# Patient Record
Sex: MALE | Employment: FULL TIME | URBAN - METROPOLITAN AREA
[De-identification: names, ages, dates, MRNs, and addresses within clinical notes are randomized per-mention and may not be internally consistent; named-entity substitution may affect disease eponyms.]

---

## 2021-06-15 ENCOUNTER — NON-PROVIDER VISIT (OUTPATIENT)
Dept: OCCUPATIONAL MEDICINE | Facility: CLINIC | Age: 31
End: 2021-06-15

## 2021-06-15 DIAGNOSIS — Z02.1 PRE-EMPLOYMENT DRUG SCREENING: ICD-10-CM

## 2021-06-15 DIAGNOSIS — Z02.1 PRE-EMPLOYMENT HEALTH SCREENING EXAMINATION: ICD-10-CM

## 2021-06-15 LAB
AMP AMPHETAMINE: NORMAL
COC COCAINE: NORMAL
INT CON NEG: NORMAL
INT CON POS: NORMAL
MET METHAMPHETAMINES: NORMAL
OPI OPIATES: NORMAL
PCP PHENCYCLIDINE: NORMAL
POC DRUG COMMENT 753798-OCCUPATIONAL HEALTH: NEGATIVE
THC: NORMAL

## 2021-06-15 PROCEDURE — 80305 DRUG TEST PRSMV DIR OPT OBS: CPT | Performed by: NURSE PRACTITIONER

## 2022-02-10 ENCOUNTER — OFFICE VISIT (OUTPATIENT)
Dept: URGENT CARE | Facility: CLINIC | Age: 32
End: 2022-02-10
Payer: COMMERCIAL

## 2022-02-10 VITALS
HEIGHT: 68 IN | TEMPERATURE: 98.3 F | RESPIRATION RATE: 14 BRPM | HEART RATE: 88 BPM | SYSTOLIC BLOOD PRESSURE: 118 MMHG | WEIGHT: 203.6 LBS | DIASTOLIC BLOOD PRESSURE: 60 MMHG | BODY MASS INDEX: 30.86 KG/M2 | OXYGEN SATURATION: 99 %

## 2022-02-10 DIAGNOSIS — K52.9 GASTROENTERITIS: ICD-10-CM

## 2022-02-10 PROCEDURE — 99204 OFFICE O/P NEW MOD 45 MIN: CPT | Performed by: PHYSICIAN ASSISTANT

## 2022-02-10 RX ORDER — ONDANSETRON 4 MG/1
4 TABLET, ORALLY DISINTEGRATING ORAL EVERY 8 HOURS PRN
Qty: 10 TABLET | Refills: 0 | Status: SHIPPED | OUTPATIENT
Start: 2022-02-10

## 2022-02-10 ASSESSMENT — ENCOUNTER SYMPTOMS
DIARRHEA: 1
VOMITING: 0
CONSTIPATION: 0
BLOOD IN STOOL: 0
ABDOMINAL PAIN: 1
FLANK PAIN: 0
NAUSEA: 1
FEVER: 0
MYALGIAS: 0
FLATUS: 0

## 2022-02-10 NOTE — LETTER
February 10, 2022         Patient: Beto Puri   YOB: 1990   Date of Visit: 2/10/2022           To Whom it May Concern:    Beto Puri was seen in my clinic on 2/10/2022.  Please excuse his absence through 2/14/2022.    If you have any questions or concerns, please don't hesitate to call.        Sincerely,           Edmond Flynn P.A.-C.  Electronically Signed

## 2022-02-10 NOTE — PROGRESS NOTES
Subjective:   Beto Puri is a 31 y.o. male who presents today with   Chief Complaint   Patient presents with   • Abdominal Pain     Pt has abdominal pain, nausea x last night        Abdominal Pain  This is a new problem. The current episode started yesterday. The onset quality is gradual. The problem occurs constantly. The problem has been unchanged. The pain is located in the generalized abdominal region. Associated symptoms include diarrhea and nausea. Pertinent negatives include no constipation, dysuria, fever, flatus, frequency, hematuria, melena, myalgias or vomiting. The pain is aggravated by eating. The pain is relieved by nothing. Treatments tried: [place] diet. The treatment provided mild relief.   Patient states he had COVID back in December.  No recent ill contacts.  Patient states his stomach started hurting after eating fast food last night.  Has had on and off watery diarrhea throughout the day today as well as feeling nauseated but no actual vomiting.  PMH:  has no past medical history on file.  MEDS:   Current Outpatient Medications:   •  ondansetron (ZOFRAN ODT) 4 MG TABLET DISPERSIBLE, Take 1 Tablet by mouth every 8 hours as needed., Disp: 10 Tablet, Rfl: 0  ALLERGIES: No Known Allergies  SURGHX: History reviewed. No pertinent surgical history.  SOCHX:  reports that he has been smoking cigarettes. He has been smoking about 0.00 packs per day. He has never used smokeless tobacco. He reports previous alcohol use. He reports previous drug use.  FH: Reviewed with patient, not pertinent to this visit.     Review of Systems   Constitutional: Negative for fever.   Gastrointestinal: Positive for abdominal pain, diarrhea and nausea. Negative for blood in stool, constipation, flatus, melena and vomiting.   Genitourinary: Negative for dysuria, flank pain, frequency, hematuria and urgency.   Musculoskeletal: Negative for myalgias.      Objective:   /60 (BP Location: Right arm, Patient Position:  "Sitting, BP Cuff Size: Large adult)   Pulse 88   Temp 36.8 °C (98.3 °F) (Temporal)   Resp 14   Ht 1.727 m (5' 8\")   Wt 92.4 kg (203 lb 9.6 oz)   SpO2 99%   BMI 30.96 kg/m²   Physical Exam  Vitals and nursing note reviewed.   Constitutional:       General: He is not in acute distress.     Appearance: Normal appearance. He is well-developed. He is not ill-appearing or toxic-appearing.   HENT:      Head: Normocephalic and atraumatic.      Right Ear: Hearing normal.      Left Ear: Hearing normal.   Cardiovascular:      Rate and Rhythm: Normal rate and regular rhythm.      Heart sounds: Normal heart sounds.   Pulmonary:      Effort: Pulmonary effort is normal.   Abdominal:      General: Bowel sounds are normal. There is no distension.      Tenderness: There is generalized abdominal tenderness.      Comments: No specific acute point tenderness on exam today.   Musculoskeletal:      Comments: Normal movement in all 4 extremities   Skin:     General: Skin is warm and dry.   Neurological:      Mental Status: He is alert.      Coordination: Coordination normal.   Psychiatric:         Mood and Affect: Mood normal.       Assessment/Plan:   Assessment    1. Gastroenteritis  - ondansetron (ZOFRAN ODT) 4 MG TABLET DISPERSIBLE; Take 1 Tablet by mouth every 8 hours as needed.  Dispense: 10 Tablet; Refill: 0  Symptoms and presentation most consistent with gastroenteritis likely related to food he ate yesterday.  Recommend bland diet and replenish fluids.  No other concerning findings that would be suggestive for acute illness or Covid given that he has had COVID in the last couple of months.  Differential diagnosis, natural history, supportive care, and indications for immediate follow-up discussed.   Patient given instructions and understanding of medications and treatment.    If not improving in 3-5 days, F/U with PCP or return to UC if symptoms worsen.    Patient agreeable to plan.      Please note that this dictation was " created using voice recognition software. I have made every reasonable attempt to correct obvious errors, but I expect that there are errors of grammar and possibly content that I did not discover before finalizing the note.    Edmond Flynn PA-C

## 2023-04-03 ENCOUNTER — APPOINTMENT (OUTPATIENT)
Dept: RADIOLOGY | Facility: IMAGING CENTER | Age: 33
End: 2023-04-03
Attending: NURSE PRACTITIONER
Payer: COMMERCIAL

## 2023-04-03 ENCOUNTER — OFFICE VISIT (OUTPATIENT)
Dept: URGENT CARE | Facility: CLINIC | Age: 33
End: 2023-04-03
Payer: COMMERCIAL

## 2023-04-03 VITALS
RESPIRATION RATE: 14 BRPM | TEMPERATURE: 98.3 F | DIASTOLIC BLOOD PRESSURE: 72 MMHG | HEART RATE: 73 BPM | BODY MASS INDEX: 31.36 KG/M2 | HEIGHT: 67 IN | OXYGEN SATURATION: 98 % | SYSTOLIC BLOOD PRESSURE: 122 MMHG | WEIGHT: 199.8 LBS

## 2023-04-03 DIAGNOSIS — S20.211A CHEST WALL CONTUSION, RIGHT, INITIAL ENCOUNTER: ICD-10-CM

## 2023-04-03 DIAGNOSIS — R07.81 RIB PAIN ON RIGHT SIDE: ICD-10-CM

## 2023-04-03 PROCEDURE — 71101 X-RAY EXAM UNILAT RIBS/CHEST: CPT | Mod: TC,RT | Performed by: NURSE PRACTITIONER

## 2023-04-03 PROCEDURE — 99213 OFFICE O/P EST LOW 20 MIN: CPT | Performed by: NURSE PRACTITIONER

## 2023-04-03 ASSESSMENT — ENCOUNTER SYMPTOMS
FEVER: 0
SHORTNESS OF BREATH: 0
WHEEZING: 0
DIZZINESS: 0
HEADACHES: 0
COUGH: 0
CHILLS: 0
MYALGIAS: 1

## 2023-04-03 NOTE — PROGRESS NOTES
Subjective     Beto Puri is a 32 y.o. male who presents with Rib Injury (X 1 week, fell multiple times at concert, pain gotten worse, hurts to breathe)            HPI  New problem.  Patient is a 32-year-old male who presents with right-sided rib injury x1 week after a fall at a concert.  He states the pain is gotten worse and it hurts to take a deep breath in and cough.  He denies any shortness of breath, fever, chills, or wheezing.  He has not taken any medications for these symptoms.  Patient has no known allergies.  Current Outpatient Medications on File Prior to Visit   Medication Sig Dispense Refill    ondansetron (ZOFRAN ODT) 4 MG TABLET DISPERSIBLE Take 1 Tablet by mouth every 8 hours as needed. (Patient not taking: Reported on 4/3/2023) 10 Tablet 0     No current facility-administered medications on file prior to visit.     Social History     Socioeconomic History    Marital status: Single     Spouse name: Not on file    Number of children: Not on file    Years of education: Not on file    Highest education level: Not on file   Occupational History    Not on file   Tobacco Use    Smoking status: Some Days     Types: Cigarettes    Smokeless tobacco: Never    Tobacco comments:     Social/occ   Vaping Use    Vaping Use: Some days    Substances: Nicotine, Flavoring   Substance and Sexual Activity    Alcohol use: Yes     Alcohol/week: 6.0 oz     Types: 10 Standard drinks or equivalent per week    Drug use: Not Currently    Sexual activity: Not on file   Other Topics Concern    Not on file   Social History Narrative    Not on file     Social Determinants of Health     Financial Resource Strain: Not on file   Food Insecurity: Not on file   Transportation Needs: Not on file   Physical Activity: Not on file   Stress: Not on file   Social Connections: Not on file   Intimate Partner Violence: Not on file   Housing Stability: Not on file     Breast Cancer-related family history is not on file.        Review of  "Systems   Constitutional:  Negative for chills and fever.   Respiratory:  Negative for cough, shortness of breath and wheezing.    Cardiovascular:         Right sided rib pain   Musculoskeletal:  Positive for myalgias.   Skin: Negative.    Neurological:  Negative for dizziness and headaches.            Objective     /72   Pulse 73   Temp 36.8 °C (98.3 °F) (Temporal)   Resp 14   Ht 1.71 m (5' 7.32\")   Wt 90.6 kg (199 lb 12.8 oz)   SpO2 98%   BMI 30.99 kg/m²      Physical Exam  Vitals and nursing note reviewed.   Constitutional:       Appearance: Normal appearance.   Cardiovascular:      Rate and Rhythm: Normal rate and regular rhythm.      Heart sounds: No murmur heard.  Pulmonary:      Effort: Pulmonary effort is normal.      Breath sounds: Normal breath sounds.      Comments: TTP below right nipple. No bruising or crepitus  Chest:      Chest wall: Tenderness present.   Neurological:      Mental Status: He is alert.                           Assessment & Plan        1. Chest wall contusion, right, initial encounter        2. Rib pain on right side  [id number] (WITH 1-VIEW CXR) RIGHT        Negative imaging. Reviewed these results with patient.   Recommend icing, nsaids.  Differential diagnosis, natural history, supportive care, and indications for immediate follow-up were discussed.                "

## 2024-12-10 ENCOUNTER — OFFICE VISIT (OUTPATIENT)
Dept: MEDICAL GROUP | Facility: MEDICAL CENTER | Age: 34
End: 2024-12-10
Payer: COMMERCIAL

## 2024-12-10 VITALS
HEIGHT: 68 IN | OXYGEN SATURATION: 98 % | DIASTOLIC BLOOD PRESSURE: 70 MMHG | WEIGHT: 218.26 LBS | HEART RATE: 73 BPM | SYSTOLIC BLOOD PRESSURE: 128 MMHG | TEMPERATURE: 97.5 F | BODY MASS INDEX: 33.08 KG/M2

## 2024-12-10 DIAGNOSIS — F41.9 ANXIETY: ICD-10-CM

## 2024-12-10 DIAGNOSIS — G47.9 SLEEP DISTURBANCES: ICD-10-CM

## 2024-12-10 DIAGNOSIS — Z00.00 ENCOUNTER FOR PREVENTIVE CARE: Primary | ICD-10-CM

## 2024-12-10 PROCEDURE — 3078F DIAST BP <80 MM HG: CPT | Performed by: STUDENT IN AN ORGANIZED HEALTH CARE EDUCATION/TRAINING PROGRAM

## 2024-12-10 PROCEDURE — 3074F SYST BP LT 130 MM HG: CPT | Performed by: STUDENT IN AN ORGANIZED HEALTH CARE EDUCATION/TRAINING PROGRAM

## 2024-12-10 PROCEDURE — 99214 OFFICE O/P EST MOD 30 MIN: CPT | Performed by: STUDENT IN AN ORGANIZED HEALTH CARE EDUCATION/TRAINING PROGRAM

## 2024-12-10 RX ORDER — BUSPIRONE HYDROCHLORIDE 10 MG/1
10 TABLET ORAL 2 TIMES DAILY
Qty: 120 TABLET | Refills: 0 | Status: SHIPPED | OUTPATIENT
Start: 2024-12-10 | End: 2024-12-10

## 2024-12-10 RX ORDER — FLUOXETINE 10 MG/1
10 CAPSULE ORAL DAILY
Qty: 60 CAPSULE | Refills: 0 | Status: SHIPPED | OUTPATIENT
Start: 2024-12-10 | End: 2025-02-08

## 2024-12-10 ASSESSMENT — PATIENT HEALTH QUESTIONNAIRE - PHQ9
CLINICAL INTERPRETATION OF PHQ2 SCORE: 2
5. POOR APPETITE OR OVEREATING: 3 - NEARLY EVERY DAY
SUM OF ALL RESPONSES TO PHQ QUESTIONS 1-9: 10

## 2024-12-10 ASSESSMENT — ANXIETY QUESTIONNAIRES
4. TROUBLE RELAXING: SEVERAL DAYS
5. BEING SO RESTLESS THAT IT IS HARD TO SIT STILL: SEVERAL DAYS
6. BECOMING EASILY ANNOYED OR IRRITABLE: MORE THAN HALF THE DAYS
2. NOT BEING ABLE TO STOP OR CONTROL WORRYING: NEARLY EVERY DAY
1. FEELING NERVOUS, ANXIOUS, OR ON EDGE: MORE THAN HALF THE DAYS
GAD7 TOTAL SCORE: 15
3. WORRYING TOO MUCH ABOUT DIFFERENT THINGS: NEARLY EVERY DAY
7. FEELING AFRAID AS IF SOMETHING AWFUL MIGHT HAPPEN: NEARLY EVERY DAY

## 2024-12-10 NOTE — PROGRESS NOTES
Verbal consent was acquired by the patient to use [employer] ambient listening note generation during this visit     Subjective:     HPI:   History of Present Illness  The patient presents for evaluation of sleep disturbance and anxiety.    He reports a recent onset of snoring, with his partner expressing concern over observed episodes of apnea. He experiences persistent fatigue but has no history of hypertension. He is not currently on any medications and does not recall taking ondansetron. He has no current symptoms of chest pain or palpitations. He has no known medical history and has discontinued vaping.     He is under the care of a psychiatrist and therapist, who have recommended consultation with a primary care physician for his anxiety issues. He describes a constant state of worry and tension. His anxiety is exacerbated by work, personal life, finances, and social situations, particularly during the holiday season. He admits to consuming alcohol to cope with social interactions, estimating a weekly intake of 4 to 6 drinks. Despite these interventions, he continues to struggle with anxiety. He identifies as a stress eater and reports a constant feeling of hunger. 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 He has not previously tried any medications for his anxiety. He has been attending the gym and exercising for the past 2 to 3 months, resulting in some weight loss and increased energy, but overall, he feels that his anxiety remains unchanged.    STOPBANG - Sleep Apnea Screening      Flowsheet Row Most Recent Value   S - Have you been told that you SNORE? Yes   T - Are you often TIRED during the day? Yes   O - Do you know if you stop breathing or has anyone witnessed you stop breathing while you were asleep? (OBSTRUCTION) Yes   P - Do you have high blood PRESSURE or on medication to control high  "blood pressure? No   B - Is your Body Mass Index greater than 35? (BMI) No   A - Are you 50 years old or older? (AGE) No   N - Are you a male with a NECK circumference greater than 17 inches, or a female with a neck circumference greater than 16 inches? No   G - Are you male? (GENDER) Yes   STOPBANG Total Score 4   ABBY Risk Intermediate Risk        BRITNEY-7 Questionnaire    Feeling nervous, anxious, or on edge: More than half the days  Not being able to sop or control worrying: Nearly every day  Worrying too much about different things: Nearly every day  Trouble relaxing: Several days  Being so restless that it's hard to sit still: Several days  Becoming easily annoyed or irritable: More than half the days  Feeling afraid as if something awful might happen: Nearly every day  Total: 15    Interpretation of BRITNEY 7 Total Score   Score Severity :  0-4 No Anxiety   5-9 Mild Anxiety  10-14 Moderate Anxiety  15-21 Severe Anxiety      SOCIAL HISTORY  The patient has stopped vaping. He drinks alcohol, consuming 4-6 drinks per week, typically in social situations or during holidays.    FAMILY HISTORY  The patient reports no major diseases in the family history that he is aware of, except for dementia on his maternal side.        Objective:     Exam:  /70 (BP Location: Left arm, Patient Position: Sitting, BP Cuff Size: Adult)   Pulse 73   Temp 36.4 °C (97.5 °F) (Temporal)   Ht 1.727 m (5' 8\")   Wt 99 kg (218 lb 4.1 oz)   SpO2 98%   BMI 33.19 kg/m²  Body mass index is 33.19 kg/m².  Review of Systems   Constitutional:  Negative for chills and fever.   Respiratory:  Negative for cough and hemoptysis.    Cardiovascular:  Negative for chest pain and palpitations.       Physical Exam  Constitutional:       Appearance: Normal appearance.   Cardiovascular:      Rate and Rhythm: Normal rate and regular rhythm.      Pulses: Normal pulses.      Heart sounds: Normal heart sounds.   Pulmonary:      Effort: Pulmonary effort is " normal.      Breath sounds: Normal breath sounds.   Neurological:      General: No focal deficit present.      Mental Status: He is alert and oriented to person, place, and time.         Assessment & Plan:     Assessment & Plan  1. Sleep disturbance.  An overnight sleep study will be conducted       2. Anxiety.  The patient reports constant anxiety, difficulty focusing, and reliance on alcohol in social situations. He is currently seeing a therapist but has not found significant relief. Prozac 10 mg daily will be started to help manage his symptoms. He is advised to continue therapy and follow up with any concerns. Lab studies, including TSH and vitamin D levels. If there is no improvement in 2 months, the dosage may be adjusted.    Problem List Items Addressed This Visit       Sleep disturbances    Relevant Orders    Overnight Home Sleep Study     Other Visit Diagnoses       Encounter for preventive care    -  Primary    Relevant Orders    Comp Metabolic Panel    CBC WITH DIFFERENTIAL    Lipid Profile    HIV AG/AB COMBO ASSAY SCREENING    HEP C VIRUS ANTIBODY    VITAMIN D,25 HYDROXY (DEFICIENCY)    Anxiety        Relevant Medications    FLUoxetine (PROZAC) 10 MG Cap    Other Relevant Orders    TSH WITH REFLEX TO FT4                Return in about 2 months (around 2/10/2025).    Please note that this dictation was created using voice recognition software. I have made every reasonable attempt to correct obvious errors, but I expect that there are errors of grammar and possibly content that I did not discover before finalizing the note.

## 2024-12-11 PROBLEM — G47.9 SLEEP DISTURBANCES: Status: ACTIVE | Noted: 2024-12-11

## 2024-12-11 ASSESSMENT — ENCOUNTER SYMPTOMS
FEVER: 0
PALPITATIONS: 0
COUGH: 0
HEMOPTYSIS: 0
CHILLS: 0

## 2024-12-13 ENCOUNTER — HOSPITAL ENCOUNTER (OUTPATIENT)
Dept: LAB | Facility: MEDICAL CENTER | Age: 34
End: 2024-12-13
Attending: STUDENT IN AN ORGANIZED HEALTH CARE EDUCATION/TRAINING PROGRAM
Payer: COMMERCIAL

## 2024-12-13 DIAGNOSIS — Z00.00 ENCOUNTER FOR PREVENTIVE CARE: ICD-10-CM

## 2024-12-13 DIAGNOSIS — F41.9 ANXIETY: ICD-10-CM

## 2024-12-13 LAB
BASOPHILS # BLD AUTO: 0.8 % (ref 0–1.8)
BASOPHILS # BLD: 0.05 K/UL (ref 0–0.12)
EOSINOPHIL # BLD AUTO: 0.05 K/UL (ref 0–0.51)
EOSINOPHIL NFR BLD: 0.8 % (ref 0–6.9)
ERYTHROCYTE [DISTWIDTH] IN BLOOD BY AUTOMATED COUNT: 38.9 FL (ref 35.9–50)
HCT VFR BLD AUTO: 47.1 % (ref 42–52)
HCV AB SER QL: NORMAL
HGB BLD-MCNC: 15.3 G/DL (ref 14–18)
HIV 1+2 AB+HIV1 P24 AG SERPL QL IA: NORMAL
IMM GRANULOCYTES # BLD AUTO: 0.07 K/UL (ref 0–0.11)
IMM GRANULOCYTES NFR BLD AUTO: 1.2 % (ref 0–0.9)
LYMPHOCYTES # BLD AUTO: 1.8 K/UL (ref 1–4.8)
LYMPHOCYTES NFR BLD: 30 % (ref 22–41)
MCH RBC QN AUTO: 25 PG (ref 27–33)
MCHC RBC AUTO-ENTMCNC: 32.5 G/DL (ref 32.3–36.5)
MCV RBC AUTO: 76.8 FL (ref 81.4–97.8)
MONOCYTES # BLD AUTO: 0.4 K/UL (ref 0–0.85)
MONOCYTES NFR BLD AUTO: 6.7 % (ref 0–13.4)
NEUTROPHILS # BLD AUTO: 3.63 K/UL (ref 1.82–7.42)
NEUTROPHILS NFR BLD: 60.5 % (ref 44–72)
NRBC # BLD AUTO: 0 K/UL
NRBC BLD-RTO: 0 /100 WBC (ref 0–0.2)
PLATELET # BLD AUTO: 338 K/UL (ref 164–446)
PMV BLD AUTO: 9.9 FL (ref 9–12.9)
RBC # BLD AUTO: 6.13 M/UL (ref 4.7–6.1)
WBC # BLD AUTO: 6 K/UL (ref 4.8–10.8)

## 2024-12-13 PROCEDURE — 36415 COLL VENOUS BLD VENIPUNCTURE: CPT

## 2024-12-13 PROCEDURE — 85025 COMPLETE CBC W/AUTO DIFF WBC: CPT

## 2024-12-13 PROCEDURE — 87389 HIV-1 AG W/HIV-1&-2 AB AG IA: CPT

## 2024-12-13 PROCEDURE — 84443 ASSAY THYROID STIM HORMONE: CPT

## 2024-12-13 PROCEDURE — 86803 HEPATITIS C AB TEST: CPT

## 2024-12-13 PROCEDURE — 80053 COMPREHEN METABOLIC PANEL: CPT

## 2024-12-13 PROCEDURE — 80061 LIPID PANEL: CPT

## 2024-12-13 PROCEDURE — 82306 VITAMIN D 25 HYDROXY: CPT

## 2024-12-14 LAB
25(OH)D3 SERPL-MCNC: 13 NG/ML (ref 30–100)
ALBUMIN SERPL BCP-MCNC: 4.7 G/DL (ref 3.2–4.9)
ALBUMIN/GLOB SERPL: 2 G/DL
ALP SERPL-CCNC: 61 U/L (ref 30–99)
ALT SERPL-CCNC: 31 U/L (ref 2–50)
ANION GAP SERPL CALC-SCNC: 12 MMOL/L (ref 7–16)
AST SERPL-CCNC: 21 U/L (ref 12–45)
BILIRUB SERPL-MCNC: 0.2 MG/DL (ref 0.1–1.5)
BUN SERPL-MCNC: 17 MG/DL (ref 8–22)
CALCIUM ALBUM COR SERPL-MCNC: 8.5 MG/DL (ref 8.5–10.5)
CALCIUM SERPL-MCNC: 9.1 MG/DL (ref 8.5–10.5)
CHLORIDE SERPL-SCNC: 103 MMOL/L (ref 96–112)
CHOLEST SERPL-MCNC: 225 MG/DL (ref 100–199)
CO2 SERPL-SCNC: 23 MMOL/L (ref 20–33)
CREAT SERPL-MCNC: 0.82 MG/DL (ref 0.5–1.4)
GFR SERPLBLD CREATININE-BSD FMLA CKD-EPI: 119 ML/MIN/1.73 M 2
GLOBULIN SER CALC-MCNC: 2.3 G/DL (ref 1.9–3.5)
GLUCOSE SERPL-MCNC: 104 MG/DL (ref 65–99)
HDLC SERPL-MCNC: 42 MG/DL
LDLC SERPL CALC-MCNC: 150 MG/DL
POTASSIUM SERPL-SCNC: 4.8 MMOL/L (ref 3.6–5.5)
PROT SERPL-MCNC: 7 G/DL (ref 6–8.2)
SODIUM SERPL-SCNC: 138 MMOL/L (ref 135–145)
TRIGL SERPL-MCNC: 164 MG/DL (ref 0–149)
TSH SERPL DL<=0.005 MIU/L-ACNC: 0.89 UIU/ML (ref 0.38–5.33)

## 2024-12-17 RX ORDER — ERGOCALCIFEROL 1.25 MG/1
50000 CAPSULE, LIQUID FILLED ORAL
Qty: 12 CAPSULE | Refills: 1 | Status: SHIPPED | OUTPATIENT
Start: 2024-12-17

## 2025-02-03 RX ORDER — FLUOXETINE 10 MG/1
10 CAPSULE ORAL DAILY
Qty: 60 CAPSULE | Refills: 0 | Status: SHIPPED | OUTPATIENT
Start: 2025-02-03 | End: 2025-02-20

## 2025-02-03 NOTE — TELEPHONE ENCOUNTER
Received request via: Patient    Was the patient seen in the last year in this department? Yes    Does the patient have an active prescription (recently filled or refills available) for medication(s) requested? No    Pharmacy Name: [facility]    Does the patient have prison Plus and need 100-day supply? (This applies to ALL medications) Patient does not have SCP

## 2025-02-12 ENCOUNTER — HOME STUDY (OUTPATIENT)
Dept: SLEEP MEDICINE | Facility: MEDICAL CENTER | Age: 35
End: 2025-02-12
Attending: STUDENT IN AN ORGANIZED HEALTH CARE EDUCATION/TRAINING PROGRAM
Payer: COMMERCIAL

## 2025-02-12 DIAGNOSIS — G47.9 SLEEP DISTURBANCES: ICD-10-CM

## 2025-02-12 DIAGNOSIS — G47.33 OSA (OBSTRUCTIVE SLEEP APNEA): ICD-10-CM

## 2025-02-12 PROCEDURE — 95800 SLP STDY UNATTENDED: CPT | Performed by: STUDENT IN AN ORGANIZED HEALTH CARE EDUCATION/TRAINING PROGRAM

## 2025-02-20 ENCOUNTER — OFFICE VISIT (OUTPATIENT)
Dept: MEDICAL GROUP | Facility: MEDICAL CENTER | Age: 35
End: 2025-02-20
Payer: COMMERCIAL

## 2025-02-20 VITALS
HEIGHT: 68 IN | SYSTOLIC BLOOD PRESSURE: 126 MMHG | HEART RATE: 87 BPM | TEMPERATURE: 98.1 F | OXYGEN SATURATION: 98 % | DIASTOLIC BLOOD PRESSURE: 68 MMHG | WEIGHT: 214.95 LBS | BODY MASS INDEX: 32.58 KG/M2

## 2025-02-20 DIAGNOSIS — E78.5 HYPERLIPIDEMIA, UNSPECIFIED HYPERLIPIDEMIA TYPE: ICD-10-CM

## 2025-02-20 DIAGNOSIS — R71.8 LOW MEAN CORPUSCULAR VOLUME (MCV): ICD-10-CM

## 2025-02-20 DIAGNOSIS — E55.9 VITAMIN D DEFICIENCY: ICD-10-CM

## 2025-02-20 DIAGNOSIS — F41.9 ANXIETY: ICD-10-CM

## 2025-02-20 PROCEDURE — 3078F DIAST BP <80 MM HG: CPT | Performed by: STUDENT IN AN ORGANIZED HEALTH CARE EDUCATION/TRAINING PROGRAM

## 2025-02-20 PROCEDURE — 99214 OFFICE O/P EST MOD 30 MIN: CPT | Performed by: STUDENT IN AN ORGANIZED HEALTH CARE EDUCATION/TRAINING PROGRAM

## 2025-02-20 PROCEDURE — 3074F SYST BP LT 130 MM HG: CPT | Performed by: STUDENT IN AN ORGANIZED HEALTH CARE EDUCATION/TRAINING PROGRAM

## 2025-02-20 RX ORDER — FLUOXETINE 10 MG/1
20 CAPSULE ORAL DAILY
Qty: 120 CAPSULE | Refills: 0 | Status: SHIPPED | OUTPATIENT
Start: 2025-02-20 | End: 2025-04-21

## 2025-02-20 ASSESSMENT — ENCOUNTER SYMPTOMS
VOMITING: 0
DIARRHEA: 0
ABDOMINAL PAIN: 0
FEVER: 0
CHILLS: 0
HEMOPTYSIS: 0
COUGH: 0
PALPITATIONS: 0

## 2025-02-20 ASSESSMENT — ANXIETY QUESTIONNAIRES
1. FEELING NERVOUS, ANXIOUS, OR ON EDGE: NEARLY EVERY DAY
6. BECOMING EASILY ANNOYED OR IRRITABLE: MORE THAN HALF THE DAYS
3. WORRYING TOO MUCH ABOUT DIFFERENT THINGS: MORE THAN HALF THE DAYS
7. FEELING AFRAID AS IF SOMETHING AWFUL MIGHT HAPPEN: MORE THAN HALF THE DAYS
5. BEING SO RESTLESS THAT IT IS HARD TO SIT STILL: SEVERAL DAYS
GAD7 TOTAL SCORE: 13
4. TROUBLE RELAXING: SEVERAL DAYS
2. NOT BEING ABLE TO STOP OR CONTROL WORRYING: MORE THAN HALF THE DAYS

## 2025-02-20 ASSESSMENT — FIBROSIS 4 INDEX: FIB4 SCORE: 0.37

## 2025-02-20 ASSESSMENT — PATIENT HEALTH QUESTIONNAIRE - PHQ9
5. POOR APPETITE OR OVEREATING: 1 - SEVERAL DAYS
SUM OF ALL RESPONSES TO PHQ QUESTIONS 1-9: 6
CLINICAL INTERPRETATION OF PHQ2 SCORE: 3

## 2025-02-20 NOTE — PROGRESS NOTES
Beto Puri Jr. is a 34 y.o. old male  who is here for lab follow-up, anxiety    BRITNEY-7 Questionnaire    Feeling nervous, anxious, or on edge: Nearly every day  Not being able to sop or control worrying: More than half the days  Worrying too much about different things: More than half the days  Trouble relaxing: Several days  Being so restless that it's hard to sit still: Several days  Becoming easily annoyed or irritable: More than half the days  Feeling afraid as if something awful might happen: More than half the days  Total: 13    Interpretation of BRITNEY 7 Total Score   Score Severity :  0-4 No Anxiety   5-9 Mild Anxiety  10-14 Moderate Anxiety  15-21 Severe Anxiety           Review of Systems   Constitutional:  Negative for chills and fever.   Respiratory:  Negative for cough and hemoptysis.    Cardiovascular:  Negative for chest pain and palpitations.   Gastrointestinal:  Negative for abdominal pain, diarrhea and vomiting.        She  has no past medical history on file.  She  has no past surgical history on file.  Family History   Problem Relation Age of Onset    Dementia Maternal Grandmother      Social History     Tobacco Use    Smoking status: Former     Types: Cigarettes    Smokeless tobacco: Never    Tobacco comments:     Social/occ   Vaping Use    Vaping status: Former    Substances: Nicotine, Flavoring    Devices: Disposable   Substance Use Topics    Alcohol use: Not Currently    Drug use: Not Currently     Patient Active Problem List    Diagnosis Date Noted    Vitamin D deficiency 02/20/2025    Low mean corpuscular volume (MCV) 02/20/2025    Hyperlipidemia 02/20/2025    Anxiety 02/20/2025    Sleep disturbances 12/11/2024    Atypical chest pain 08/11/2015    Palpitations 08/11/2015     Current Outpatient Medications   Medication Sig Dispense Refill    FLUoxetine (PROZAC) 10 MG Cap Take 2 Capsules by mouth every day for 60 days. 120 Capsule 0    vitamin D2, Ergocalciferol, (DRISDOL) 1.25 MG (01790 UT)  "Cap capsule Take 1 Capsule by mouth every 7 days. 12 Capsule 1     No current facility-administered medications for this visit.    (including changes today)  Allergies: Patient has no known allergies.    /68 (BP Location: Left arm, Patient Position: Sitting, BP Cuff Size: Adult)   Pulse 87   Temp 36.7 °C (98.1 °F) (Temporal)   Ht 1.727 m (5' 8\")   Wt 97.5 kg (214 lb 15.2 oz)   SpO2 98%      Physical Exam  Constitutional:       Appearance: Normal appearance.   Cardiovascular:      Rate and Rhythm: Normal rate and regular rhythm.      Pulses: Normal pulses.      Heart sounds: Normal heart sounds. No murmur heard.  Pulmonary:      Effort: Pulmonary effort is normal. No respiratory distress.      Breath sounds: Normal breath sounds.   Neurological:      Mental Status: He is alert.           Latest Reference Range & Units 12/13/24 10:11   WBC 4.8 - 10.8 K/uL 6.0   RBC 4.70 - 6.10 M/uL 6.13 (H)   Hemoglobin 14.0 - 18.0 g/dL 15.3   Hematocrit 42.0 - 52.0 % 47.1   MCV 81.4 - 97.8 fL 76.8 (L)   MCH 27.0 - 33.0 pg 25.0 (L)   (H): Data is abnormally high  (L): Data is abnormally low   Latest Reference Range & Units 12/13/24 10:11   Cholesterol,Tot 100 - 199 mg/dL 225 (H)   Triglycerides 0 - 149 mg/dL 164 (H)   HDL >=40 mg/dL 42   LDL <100 mg/dL 150 (H)   (H): Data is abnormally high     Latest Reference Range & Units 12/13/24 10:11   25-Hydroxy   Vitamin D 25 30 - 100 ng/mL 13 (L)   (L): Data is abnormally low  Assessment and plan:    Problem List Items Addressed This Visit          Family Medicine Problems    Vitamin D deficiency    - Continue vitamin D 50,000 units every 7 days to complete 3-month course.          Relevant Orders    VITAMIN D,25 HYDROXY (DEFICIENCY)       Other    Low mean corpuscular volume (MCV)    Likely thalassemia trait.  Obtain iron levels.         Relevant Orders    IRON/TOTAL IRON BIND    Hyperlipidemia    - Diet and lifestyle modifications         Relevant Orders    Lipid Profile    " Anxiety    - He reports he has cut down on his alcohol intake significantly.  - Improvement in BRITNEY score  - Increase Prozac to 20 mg daily.         Relevant Medications    FLUoxetine (PROZAC) 10 MG Cap          Return in about 6 months (around 8/20/2025) for Labfollowup.        Please note that this dictation was created using voice recognition software. I have made every reasonable attempt to correct obvious errors, but I expect that there are errors of grammar and possibly content that I did not discover before finalizing the note.

## 2025-02-21 NOTE — ASSESSMENT & PLAN NOTE
- He reports he has cut down on his alcohol intake significantly.  - Improvement in BRITNEY score  - Increase Prozac to 20 mg daily.

## 2025-02-22 NOTE — PROCEDURES
DIAGNOSTIC HOME SLEEP TEST (HST) REPORT WatchPAT      PATIENT ID:  NAME:  Beto Puri  MRN:               [id number]  YOB: 1990  DATE OF STUDY: 2/12/2025      Impression:     This study shows evidence of:      1. Severe obstructive sleep apnea with PAT apnea hypopnea index(3%pAHI) of 55.1 per hour.  PAT respiratory disturbance index (pRDI) was 56.2 per hour. These findings are based on 7 channels recording of PAT signal with sleep staging, heart rate, pulse oximetry, actigraphy, body position, snoring and respiratory movement.     2. Oxygenation O2 Sat. mean O2 sat was 93%,  bev was 81%,  and maximum O2 at 98 %. O2 sat was at or  below 88% for 5.7 min of evaluation time. Oxygen Desaturation (>=4%) Index was 32.6/hr. AVG HR was 64 BPM.      TECHNICAL DESCRIPTION: Patient underwent home sleep apnea testing with peripheral arterial tone signal (WatchPAT™). This is a Type IV portable monitor and device per Medicare. Monitoring was done with 7 channels recording of PAT signal with sleep staging, heart rate, pulse oximetry, actigraphy, body position, snoring and respiratory movement. Prior to using the device, the patient received verbal and written instructions for its application and was provided with the help desk phone number for additional telephonic instruction with 24-hour availability of qualified personnel to answer questions.    Respiratory events:            General sleep summary: . Total recording time is 7 hours and 27 minutes and total Sleep time is 6 hours and 50 minutes. The patient spent 250.5 minutes in the supine position and 159.5 minutes in the nonsupine position.      Recommendations:    1. CPAP titration study vs Auto CPAP trial. If initiating empiric autoCPAP would recommend 5-15 cmH2O with heated tubing and proper mask fit    2. I also recommend 30 day compliance download to assess the efficacy to the recommended pressure, measure leak, apnea hypopnea index and  compliance for further outpatient monitoring and management of PAP therapy. In some cases alternative treatment options may be proven effective in resolving sleep apnea. These options include upper airway surgery, the use of a dental orthotic, weight loss, or positional therapy. Clinical correlation is required. In general patients with sleep apnea are advised to avoid alcohol, sedatives and not to operate a motor vehicle while drowsy. Untreated sleep apnea increases the risk for cardiovascular and neurovascular disease.            Neelam Graf MD

## 2025-02-23 ENCOUNTER — RESULTS FOLLOW-UP (OUTPATIENT)
Dept: MEDICAL GROUP | Facility: MEDICAL CENTER | Age: 35
End: 2025-02-23
Payer: COMMERCIAL

## 2025-02-23 DIAGNOSIS — G47.33 OBSTRUCTIVE SLEEP APNEA: ICD-10-CM

## 2025-03-19 ASSESSMENT — ENCOUNTER SYMPTOMS: SLEEP DISTURBANCE: 0

## 2025-03-20 ENCOUNTER — OFFICE VISIT (OUTPATIENT)
Dept: SLEEP MEDICINE | Facility: MEDICAL CENTER | Age: 35
End: 2025-03-20
Attending: NURSE PRACTITIONER
Payer: COMMERCIAL

## 2025-03-20 VITALS
BODY MASS INDEX: 34.53 KG/M2 | RESPIRATION RATE: 14 BRPM | WEIGHT: 220 LBS | HEIGHT: 67 IN | SYSTOLIC BLOOD PRESSURE: 104 MMHG | DIASTOLIC BLOOD PRESSURE: 76 MMHG | OXYGEN SATURATION: 96 % | HEART RATE: 65 BPM

## 2025-03-20 DIAGNOSIS — G47.33 OSA (OBSTRUCTIVE SLEEP APNEA): ICD-10-CM

## 2025-03-20 PROCEDURE — 99204 OFFICE O/P NEW MOD 45 MIN: CPT | Performed by: NURSE PRACTITIONER

## 2025-03-20 PROCEDURE — 99213 OFFICE O/P EST LOW 20 MIN: CPT | Performed by: NURSE PRACTITIONER

## 2025-03-20 ASSESSMENT — FIBROSIS 4 INDEX: FIB4 SCORE: 0.37

## 2025-03-20 NOTE — PROGRESS NOTES
Chief Complaint   Patient presents with    Follow-Up     SLEEP - NEW PATIENT CHART PREP COMPLETED ON 03/05/2025    Ref by Obstructive sleep apnea Jody Tristan M.D. Dx: G47.33 (ICD-10-CM) - Obstructive sleep apnea    Is Pt currently on PAP/O2? NO     Any prior Sleep Studies? YES .02/12/2025    Previously seen with [facility]? NO       HPI:  Beto Puri Jr. is a 34 y.o. year old male here today for initial consult for sleep medicine kindly referred by PCP patient was referred for the following symptoms including recent onset of snoring, with his partner expressing concern over observed episodes of apnea. He experiences persistent fatigue but has no history of hypertension.  Patient does average around 10 hours of sleep.  He does feel tired when he wakes up.  He does daytime fatigue, but denies falling asleep accidentally.  He states he tries to keep moving and energized throughout the day.  Patient does endorse loud snoring and has been told that he stops breathing in his sleep.  Denies any symptoms associated with parasomnias.      As per supplemental questionnaire to be scanned or imported into chart:    [place] Sleepiness Score: 10    Sleep Schedule  Bedtime: Weekday 9 Weekend 10  Wake time: Weekday 7 Weekend 8  Sleep-onset latency: 10 mins  Awakenings from sleep: 2  Difficulty falling back asleep: occassional  Bedroom partner: yes  Naps: No     DAYTIME SYMPTOMS:   Excessive daytime sleepiness: Yes  Daytime fatigue: Yes  Difficulty concentrating: Yes  Memory problems: Yes  Irritability:Yes  Work/school performance issues: No   Sleepiness with driving: Yes  Caffeine/stimulant use: No   Alcohol use:No     SLEEP RELATED SYMPTOMS  Snoring: Yes  Witnessed apnea or gasping/choking: Yes  Dry mouth or mouth breathing: No   Night Sweating: No   Teeth grinding/biting: No   Morning headaches: Yes  Refreshed Upon Awakening: Yes     SLEEP RELATED BEHAVIORS:  Parasomnias (walking, talking, eating, violence): Yes  "talking  Leg kicking: No   Restless legs - \"urge to move\": No   Nightmares: Yes Recurrent: Yes  Dream enactment: No      NARCOLEPSY:  Cataplexy: No   Sleep paralysis: Yes  Sleep attacks: No   Hypnagogic/hypnopompic hallucinations: No     HST (2/12/2025):  1. Severe obstructive sleep apnea with PAT apnea hypopnea index(3%pAHI) of 55.1 per hour.  PAT respiratory disturbance index (pRDI) was 56.2 per hour. These findings are based on 7 channels recording of PAT signal with sleep staging, heart rate, pulse oximetry, actigraphy, body position, snoring and respiratory movement.      2. Oxygenation O2 Sat. mean O2 sat was 93%,  bev was 81%,  and maximum O2 at 98 %. O2 sat was at or  below 88% for 5.7 min of evaluation time. Oxygen Desaturation (>=4%) Index was 32.6/hr. AVG HR was 64 BPM.    ROS: As per HPI and otherwise negative if not stated.    No past medical history on file.    No past surgical history on file.    Family History   Problem Relation Age of Onset    Dementia Maternal Grandmother        Allergies as of 03/20/2025    (No Known Allergies)        Vitals:  /76 (BP Location: Left arm, Patient Position: Sitting, BP Cuff Size: Adult)   Pulse 65   Resp 14   Ht 1.702 m (5' 7\")   Wt 99.8 kg (220 lb)   SpO2 96%     Current medications as of today   Current Outpatient Medications   Medication Sig Dispense Refill    FLUoxetine (PROZAC) 10 MG Cap Take 2 Capsules by mouth every day for 60 days. 120 Capsule 0    vitamin D2, Ergocalciferol, (DRISDOL) 1.25 MG (59866 UT) Cap capsule Take 1 Capsule by mouth every 7 days. 12 Capsule 1     No current facility-administered medications for this visit.         Physical Exam:   Gen:           Alert and oriented, No apparent distress. Mood and affect appropriate, normal interaction with examiner.  Eyes:          PERRL, EOM intact, sclere white, conjunctive moist.  Ears:          Not examined.   Hearing:     Grossly intact.  Nose:          Normal, no lesions or " deformities.  Dentition:    Good dentition.  Oropharynx:   Tongue normal, posterior pharynx without erythema or exudate.  Neck:        Supple, trachea midline, no masses.  Respiratory Effort: No intercostal retractions or use of accessory muscles.   Lung Auscultation:      Clear to auscultation bilaterally; no rales, rhonchi or wheezing.  CV:            Regular rate and rhythm. No murmurs, rubs or gallops.  Abd:           Not examined.   Lymphadenopathy: Not examined.  Gait and Station: Normal.  Digits and Nails: No clubbing, cyanosis, petechiae, or nodes.   Cranial Nerves: II-XII grossly intact.  Skin:        No rashes, lesions or ulcers noted.               Ext:           No cyanosis or edema.      Assessment:  1. ABBY (obstructive sleep apnea)  DME CPAP          Plan:   I reviewed with the patient the pathophysiology of obstructive sleep apnea, as well as potential cardiac and neurologic risks associated with untreated sleep apnea including CAD, HTN, pulmonary arterial hypertension, cardiac arrhythmias, heart attack or stroke.  ABBY patient's have increased risk of motor vehicle accidents, DM type II, chronic kidney disease and nonalcoholic liver disease.  He is cautioned against driving while sleepy for his safety and safety of others on the road. We reviewed treatment modalities for sleep apnea including CPAP/BiPAP therapy, ENT referral, dental appliance.      Sleep study showed evidence of severe sleep apnea.  Patient is amendable to trial on auto CPAP and should benefit favorably from positive airway pressure.    DME : I sleep    Once you receive your new PAP machine from the [employer] company you're referred to, we must see you back for an office visit between 30-90 days of you using the machine to review compliance.  If you were ordered an ASV machine, we need to see you in office no sooner than your 60th day on therapy.     This is a very important time frame for insurance purposes. If you do  "not follow up with our office for compliance your insurance may not continue to pay for the machine. Also if you do not use the machine for at least 4 hours each night, you may be deemed \"Incompliant\", in which case the insurance may also not continue to pay for the machine.    If you are incompliant, you may have to surrender your machine to the DME company and start the process over if you wish to continue therapy after that. Meaning a new office consult and new sleep studies.    For your first visit back with our office, please bring the whole machine with the power cord. We will download the compliance off the SD card in the machine, and are able to make changes if need be in office. Some machines have a modem and we can access the data wirelessly, if this is the case please make sure the [employer] company grants us access to your machine.  For all follow up appointments after that, you will only need to bring the SD card to the appointment.    If you are having any issues with the mask, you have a 30 day window to exchange and try something else with your DME company.  If you are having issues with the pressure, please call our office at [phone].  These issues can cause you to not be able to use machine appropriately, there for make you incompliant.    Please call our office if you have any questions.    Thank you, [facility].  [phone]      Please note that this dictation was created using voice recognition software. I have made every reasonable attempt to correct obvious errors, but it is possible there are errors of grammar and possibly content that I did not discover before finalizing the note.    Answers submitted by the patient for this visit:  Sleep Center Questionnaire (Submitted on 3/19/2025)  Year of your last physical exam: 2019  Results of exam: N/a  Occupation : Printer tech  Height: 5'7  Current weight: 220  6 months ago: 220  At age 21: 130  What is the reason for your visit today?: " Sleep study/snoring/sleep apnea  Name of person referring you to the Sleep Center: Dr. Jody Tristan  Have you ever been hospitalized?: No  Have you ever had problems with anesthesia?: No  Have you experienced post-operative delirium?: No  Any complications with surgery?: No  What year did you receive your last Flu shot?: N/a  What year did you receive you last Pneumonia shot?: N/a  Have you had a TB skin test? If so, please list the year and result:: Yes, 2012(lilian) i did not have TB  Have you had Allergy skin testing? If so, please list the year and result:: No  Please briefly describe your sleep problem and how old you were when it began.: I first noticed i had sleep apnea when i was about 26 years old. I would wake up gasping for air. I don't feel it now but my girlfriend says i stop breating abruptly while sleeping/ in between snoring  How does this affect your daily life and activities? Please also rate how serious of a problem this is (1 = Not at all, 10 = Very Serious).: 5. I don't feel well rested and my girlfriend wakes me up becuase my snoring keeps her up.  Have you had any previous evaluations, examinations, or treatment for this sleep problem or any other problems with your sleep? If so, please describe the evaluation, treatment, and results.: None yet, other than the sleep study warlier last month  Have you used any medications (prescribed or otherwise) to help your sleep problem? If yes, include name, amount, frequency, and the prescribing physician.: None. I did try nasal strips, mouth tape, nose dialators, and a special angled pillow  If employed, what time do you usually start and end work?: I start at 8 and work till 5  Do you ever change work shifts? If yes, describe how often (never, infrequently, regularly).: Never  What time do you usually go to bed and wake up on: Weekdays? Weekends?: Weekdays, i wake up around 7 and go to bed anywhere between 9 and 11. Weekends i wake up between 7 and 9  and go to bed between 9 and 1am  Do you have a regular bed partner?: Yes  How many minutes does it usually take to fall asleep at night after turning off the lights?: I usually fall asleep pretty much as soon as my head hits the pillow. When I'm strsssed i toss and turn a bit more though.  What do you ordinarily do just prior to turning out the lights and attempting to go to sleep (e.g., reading, TV, baths, etc.)?: I usually watch tv or play on my phone  On average, how many times do you wake up during the night?: 2-3 it feels like.  On average, how many times do you wake up to use the bathroom?: Rarely. Maybe once every few nights.  Do you often wake up too early in the morning and are unable to return to sleep?: Sometimes. Usually a couole hours before my alarm  On average, how many hours of sleep do you get per night?: 6-8 typically  How do you usually awaken?  Alarm, spontaneously, or other?: Usually my alarm, sometimes i wake up  [id number] minutes before my alarm  Is it difficult for you to awaken and get out of bed after sleeping? (Not at all, Sometimes, Very): Very  Do you nap or return to bed after arising?: Not usually. Only if I'm sick or slept especially bad.  Are you bothered by sleepiness during the day?: Yes  Do you feel that you get too much sleep at night?: No  Do you feel that you get too little sleep at night?: Yes  Do you usually feel tired during the day? If so, what do you attribute this to?: Yes. I'm not sure  Do you find yourself falling asleep when you don't mean to? : Sometimes. Not often thouh  If yes, how long does your sleep episode last?: Anywhere betwen half an hour to 2 hours  Do you feel rested or refreshed after the sleep episode?: Yes  Have you ever suddenly fallen?: No  Have you ever experienced sudden body weakness?: No  Have you ever experienced weakness or paralysis upon going to sleep?: I've experience sleep paralysis, if that counts  Have you ever experienced weakness or  "paralysis upon awakening from sleep?: Sleep paralysis happens periodically. Less often than when i was in my 20's but every fee months it'll happen  If yes for either of the above two questions, please indicate how many times per week does this occur?: Not often, once every frw months. It used to be a once or twoce a week issue  Have you ever experienced seeing things or hearing voices/noises: That weren't real? On going to sleep? During the night? On awakening from sleep? During the day?: Not that I'm aware of  Do you have difficulty breathing at night? If yes, briefly describe.: I don't feel like i do but my girlfriend says i do  How many times per week?: Every night for the paat 2 years  How did you become aware of this, at what age did this first occur, and how many years has this occurred?: It started with sleep apnea that would wake me when i was 26, i stopped waking up from it but my girlfriend and past partners have mentioned that i stop breathing periodically and suddenly start breathing again after weird noises  Have you been told you snore while asleep? If so, does it disturb a bed partner (or someone in the same room), or someone in the next room?: Yes, and yes  Have you ever experienced doing something without being aware of the action? If yes, please describe.: I sleep talk  How many times per week does this occur?: I'm not sure. About once a week at least  Have you ever experienced upon lying in bed before sleep or on awakening from sleep: Restlessness of legs, \"nervous legs\", \"creeping crawling\" sensation of legs, or twitching of legs?: Yes  How many times per week does this occur, and how many minutes does the sensation last?: About once every 2 or 3 weeks. And it seems like it goes on at least an hour  At what age did this first occur, and how many years has this occurred?: Sometimes wothin theblast 3 years.  Have you ever been told that your arms or legs jerk or twitch while you are asleep? If " yes, how many times per night does this occur?: Yes. 10 or more  At what age did this first occur, and how many years has this occurred?: I don't know. At lwastthe alst 2 years according to my partner  Does this seem to awaken you from your sleep?: No  Do you know, or have you ever been told that you do any of the following while sleeping: talk, walk, grit teeth, wet the bed, wake up screaming or seemingly afraid, have disturbing dreams, have unusual movements, wake up with headaches, (males) have erections? If yes to any of these, please indicate how many times per week, age started, last occurrence, treatment received.: Yes, i sleep talk and jerk around in bed. It happens a few times per week. No treatments have been tried yet  Has anyone in your family been known to have any sleep problems? If yes, please list the type of problem (e.g., trouble getting to sleep, too sleepy, bed wetting, etc.), the relationship of this person to you, and the treatment received.: None that have been expressed to me

## 2025-04-23 RX ORDER — FLUOXETINE 10 MG/1
20 CAPSULE ORAL DAILY
Qty: 120 CAPSULE | Refills: 0 | Status: SHIPPED | OUTPATIENT
Start: 2025-04-23 | End: 2025-06-22

## 2025-05-08 RX ORDER — FLUOXETINE 10 MG/1
20 CAPSULE ORAL DAILY
Qty: 120 CAPSULE | Refills: 0 | Status: SHIPPED | OUTPATIENT
Start: 2025-05-08 | End: 2025-07-07

## 2025-06-03 ENCOUNTER — OFFICE VISIT (OUTPATIENT)
Dept: URGENT CARE | Facility: CLINIC | Age: 35
End: 2025-06-03
Payer: COMMERCIAL

## 2025-06-03 VITALS
HEART RATE: 80 BPM | OXYGEN SATURATION: 96 % | WEIGHT: 218 LBS | HEIGHT: 67 IN | BODY MASS INDEX: 34.21 KG/M2 | DIASTOLIC BLOOD PRESSURE: 86 MMHG | RESPIRATION RATE: 16 BRPM | TEMPERATURE: 97.2 F | SYSTOLIC BLOOD PRESSURE: 134 MMHG

## 2025-06-03 DIAGNOSIS — K08.89 TOOTH PAIN: ICD-10-CM

## 2025-06-03 DIAGNOSIS — K04.7 DENTAL INFECTION: Primary | ICD-10-CM

## 2025-06-03 PROCEDURE — 3079F DIAST BP 80-89 MM HG: CPT

## 2025-06-03 PROCEDURE — 99213 OFFICE O/P EST LOW 20 MIN: CPT

## 2025-06-03 PROCEDURE — 3075F SYST BP GE 130 - 139MM HG: CPT

## 2025-06-03 RX ORDER — MELOXICAM 15 MG/1
15 TABLET ORAL DAILY
Qty: 30 TABLET | Refills: 0 | Status: SHIPPED | OUTPATIENT
Start: 2025-06-03

## 2025-06-03 ASSESSMENT — FIBROSIS 4 INDEX: FIB4 SCORE: 0.38

## 2025-06-03 NOTE — PROGRESS NOTES
Subjective     Beto Puri Jr. is a 35 y.o. male who presents with Dental Pain (Dental pain bottom right,molar potential infection, wont see dentis till 6/30/25)    HPI:   Beto is a 34yo male presenting today for right lower molar pain. He has a scheduled appointment with the dentist 6/30/25. Reports dental caries with poor dentition. He denies nuchal rigidity. Denies limitation in neck movement. No trouble swallowing or drooling. Denies fever, no headache. Denies respiratory distress or airway compromise. No sore throat. Denies ear pain or vision changes.       Review of Systems   Constitutional:  Negative for chills, fever and malaise/fatigue.   HENT:  Negative for congestion, ear discharge, ear pain, sinus pain and sore throat.    Eyes:  Negative for blurred vision, double vision, photophobia, pain, discharge and redness.   Respiratory:  Negative for cough, shortness of breath and stridor.    Gastrointestinal:  Negative for abdominal pain, nausea and vomiting.   Musculoskeletal:  Negative for back pain, joint pain, myalgias and neck pain.   Skin:  Negative for rash.   Neurological:  Negative for dizziness, focal weakness, weakness and headaches.     History reviewed. No pertinent past medical history.     History reviewed. No pertinent surgical history.     Patient has no known allergies.     Current Outpatient Medications:     FLUoxetine (PROZAC) 10 MG Cap, Take 2 Capsules by mouth every day for 60 days., Disp: 120 Capsule, Rfl: 0    vitamin D2, Ergocalciferol, (DRISDOL) 1.25 MG (59458 UT) Cap capsule, Take 1 Capsule by mouth every 7 days., Disp: 12 Capsule, Rfl: 1     Social History  Tobacco Use    Smoking status: Former     Types: Cigarettes    Smokeless tobacco: Never    Tobacco comments:     Social/occ   Vaping Use    Vaping status: Former    Substances: Nicotine, Flavoring    Devices: Disposable   Substance Use Topics    Alcohol use: Not Currently    Drug use: Not Currently      Family History  "  Problem Relation Age of Onset    Dementia Maternal Grandmother      Medications, Allergies, and current problem list reviewed today in Epic.      Objective     /86   Pulse 80   Temp 36.2 °C (97.2 °F) (Temporal)   Resp 16   Ht 1.702 m (5' 7\")   Wt 98.9 kg (218 lb)   SpO2 96%   BMI 34.14 kg/m²      Physical Exam  Vitals reviewed.   Constitutional:       General: He is not in acute distress.  HENT:      Head: No right periorbital erythema or left periorbital erythema.      Jaw: There is normal jaw occlusion. No tenderness, swelling, pain on movement or malocclusion.      Salivary Glands: Right salivary gland is not diffusely enlarged or tender. Left salivary gland is not diffusely enlarged or tender.      Right Ear: Tympanic membrane, ear canal and external ear normal.      Left Ear: Tympanic membrane, ear canal and external ear normal.      Nose: Nose normal.      Right Sinus: No maxillary sinus tenderness or frontal sinus tenderness.      Left Sinus: No maxillary sinus tenderness or frontal sinus tenderness.      Mouth/Throat:      Lips: No lesions.      Mouth: Mucous membranes are moist. No oral lesions or angioedema.      Dentition: Abnormal dentition. Dental tenderness and dental caries present. No gingival swelling, dental abscesses or gum lesions.      Tongue: No lesions. Tongue does not deviate from midline.      Palate: No mass and lesions.      Pharynx: Uvula midline. No oropharyngeal exudate, posterior oropharyngeal erythema or uvula swelling.      Tonsils: No tonsillar abscesses.   Eyes:      General: Vision grossly intact. Gaze aligned appropriately. No visual field deficit.     Extraocular Movements: Extraocular movements intact.      Conjunctiva/sclera: Conjunctivae normal.      Pupils: Pupils are equal, round, and reactive to light.      Right eye: Pupil is round, reactive and not sluggish.      Left eye: Pupil is round, reactive and not sluggish.      Funduscopic exam:     Right eye: Red " reflex present.         Left eye: Red reflex present.  Neck:      Trachea: Trachea normal. No abnormal tracheal secretions or tracheal deviation.   Cardiovascular:      Rate and Rhythm: Normal rate and regular rhythm.      Pulses: Normal pulses.      Heart sounds: Normal heart sounds.   Pulmonary:      Effort: Pulmonary effort is normal. No tachypnea, accessory muscle usage, prolonged expiration, respiratory distress or retractions.      Breath sounds: Normal breath sounds. No stridor, decreased air movement or transmitted upper airway sounds. No wheezing, rhonchi or rales.   Musculoskeletal:         General: Normal range of motion.      Cervical back: Full passive range of motion without pain, normal range of motion and neck supple. No erythema, rigidity, tenderness or crepitus. No pain with movement. Normal range of motion.   Lymphadenopathy:      Cervical: No cervical adenopathy.   Skin:     General: Skin is warm and dry.      Findings: No rash.   Neurological:      Mental Status: He is alert. Mental status is at baseline.      Sensory: Sensation is intact.      Motor: Motor function is intact.      Coordination: Coordination is intact.      Gait: Gait is intact.   Psychiatric:         Mood and Affect: Mood normal.         Behavior: Behavior normal.         Thought Content: Thought content normal.         Assessment & Plan    1. Dental infection (Primary)   - amoxicillin-clavulanate (AUGMENTIN) 875-125 MG Tab; Take 1 Tablet by mouth 2 times a day for 7 days.  Dispense: 14 Tablet; Refill: 0    2. Tooth pain   - meloxicam (MOBIC) 15 MG tablet; Take 1 Tablet by mouth every day.  Dispense: 30 Tablet; Refill: 0       MDM/Comments:   Patient with dental pain, history and examination most consistent with dental infection. Will treat with Augmentin. Patient advised to follow up with dentist as soon as possible. No evidence of facial cellulitis at this time. STRICT emergency room precautions given including signs and  symptoms of tooth abscess and cellulitis. Patient verbalizes understanding of when to present to emergency room or call 911.   No red flag/alarm feature findings present on history or examination.     If no improvement within 48 to 72 hours, new symptoms develop at any point or symptoms worsen present to emergency department.       If patient develops severe symptoms such as drooling/difficulty swallowing, difficulty opening their mouth, facial/neck redness or swelling, audible stridor or wheezing, difficulty moving their neck or other severe and concerning symptoms-I recommend that they go to the emergency room for further evaluation and management.    Illness progression and alarm symptoms discussed with patient, emphasizing low threshold for returning to clinic/emergency department for worsening symptoms. Patient is agreeable to the plan and verbalizes understanding, and will follow up if warranted.       Differential diagnosis, natural history, supportive care, and indications for immediate follow-up discussed.     Follow-up as needed if symptoms worsen or fail to improve to PCP, Urgent care or Emergency Room.                             Electronically signed by TERESA Bruner

## 2025-06-04 ASSESSMENT — ENCOUNTER SYMPTOMS
SORE THROAT: 0
DIZZINESS: 0
DOUBLE VISION: 0
ABDOMINAL PAIN: 0
FEVER: 0
SINUS PAIN: 0
NECK PAIN: 0
EYE DISCHARGE: 0
BACK PAIN: 0
STRIDOR: 0
FOCAL WEAKNESS: 0
PHOTOPHOBIA: 0
WEAKNESS: 0
MYALGIAS: 0
VOMITING: 0
EYE REDNESS: 0
NAUSEA: 0
EYE PAIN: 0
SHORTNESS OF BREATH: 0
COUGH: 0
BLURRED VISION: 0
CHILLS: 0
HEADACHES: 0

## 2025-06-04 ASSESSMENT — VISUAL ACUITY: OU: 1

## 2025-06-07 ENCOUNTER — HOSPITAL ENCOUNTER (EMERGENCY)
Facility: MEDICAL CENTER | Age: 35
End: 2025-06-07
Attending: EMERGENCY MEDICINE
Payer: COMMERCIAL

## 2025-06-07 VITALS
RESPIRATION RATE: 16 BRPM | DIASTOLIC BLOOD PRESSURE: 86 MMHG | HEIGHT: 67 IN | WEIGHT: 220.9 LBS | OXYGEN SATURATION: 96 % | HEART RATE: 87 BPM | TEMPERATURE: 98.6 F | SYSTOLIC BLOOD PRESSURE: 137 MMHG | BODY MASS INDEX: 34.67 KG/M2

## 2025-06-07 DIAGNOSIS — K08.89 PAIN, DENTAL: Primary | ICD-10-CM

## 2025-06-07 PROCEDURE — 99282 EMERGENCY DEPT VISIT SF MDM: CPT

## 2025-06-07 RX ORDER — HYDROCODONE BITARTRATE AND ACETAMINOPHEN 5; 325 MG/1; MG/1
1 TABLET ORAL EVERY 6 HOURS PRN
Qty: 10 TABLET | Refills: 0 | Status: SHIPPED | OUTPATIENT
Start: 2025-06-07 | End: 2025-06-11

## 2025-06-07 RX ORDER — CLINDAMYCIN HYDROCHLORIDE 300 MG/1
300 CAPSULE ORAL 3 TIMES DAILY
Qty: 21 CAPSULE | Refills: 0 | Status: ACTIVE | OUTPATIENT
Start: 2025-06-07 | End: 2025-06-14

## 2025-06-07 ASSESSMENT — FIBROSIS 4 INDEX: FIB4 SCORE: 0.38

## 2025-06-07 ASSESSMENT — PAIN DESCRIPTION - PAIN TYPE: TYPE: ACUTE PAIN

## 2025-06-07 NOTE — ED TRIAGE NOTES
Chief Complaint   Patient presents with    Dental Pain     Ambulates to triage reports right dental pain x 1 week worse the last 4 days. Already taking antibiotics but states feels like still not getting better. Noted minimal right sided facial swelling.      Educated on triage process. Instructed to notify staff for any worsening symptoms. Denies any recent travel. Denies exposure to known covid positive patients. Denies any respiratory symptoms.

## 2025-06-07 NOTE — ED PROVIDER NOTES
ED Provider Note    CHIEF COMPLAINT  Chief Complaint   Patient presents with    Dental Pain     Ambulates to triage reports right dental pain x 1 week worse the last 4 days. Already taking antibiotics but states feels like still not getting better. Noted minimal right sided facial swelling.        EXTERNAL RECORDS REVIEWED  Outpatient Notes Augmentin prescription and PDMP for dental pain/[place].  No concern for abuse    HPI/ROS  LIMITATION TO HISTORY   Select: : None    OUTSIDE HISTORIAN(S):  none    Beto Puri Jr. is a 35 y.o. male who presents for right lower dental pain ongoing for 1 week.  Patient went to urgent care on Tuesday 6/3 and received a prescription for Augmentin and meloxicam.  He has been taking Augmentin since Tuesday evening and took his last dose this morning without relief.  Patient denies fever, difficulty breathing, history of diabetes, trauma.  Patient has a dental appointment next Friday on 6/13.  Patient states he has been taking extra strength Tylenol 500 mg 3 tablets 3 times daily without relief.  Patient has also been taking ibuprofen this morning with the meloxicam without relief.    PAST MEDICAL HISTORY     Denies    SURGICAL HISTORY  patient denies any surgical history    FAMILY HISTORY  Family History   Problem Relation Age of Onset    Dementia Maternal Grandmother        SOCIAL HISTORY  Social History     Tobacco Use    Smoking status: Former     Types: Cigarettes    Smokeless tobacco: Never    Tobacco comments:     Social/occ   Vaping Use    Vaping status: Former    Substances: Nicotine, Flavoring    Devices: Disposable   Substance and Sexual Activity    Alcohol use: Not Currently    Drug use: Not Currently    Sexual activity: Not on file       CURRENT MEDICATIONS  Home Medications       Reviewed by Jackelyn Walker R.N. (Registered Nurse) on 06/07/25 at 1309  Med List Status: Partial     Medication Last Dose Status   amoxicillin-clavulanate (AUGMENTIN) 875-125 MG Tab  Active  "  FLUoxetine (PROZAC) 10 MG Cap  Active   meloxicam (MOBIC) 15 MG tablet  Active   vitamin D2, Ergocalciferol, (DRISDOL) 1.25 MG (46284 UT) Cap capsule  Active                    ALLERGIES  Allergies[1]    PHYSICAL EXAM  VITAL SIGNS: /88   Pulse 72   Temp 36.8 °C (98.2 °F) (Temporal)   Resp 15   Ht 1.702 m (5' 7\")   Wt 100 kg (220 lb 14.4 oz)   SpO2 94%   BMI 34.60 kg/m²    General:  WDWN male, nontoxic appearing in NAD; A+Ox3; V/S as above; afebrile  Skin: warm and dry; good color; no rash  HEENT: NC; minimal lower jaw swelling; EOMs intact; PERRL; no scleral icterus; posterior pharynx with petechiae; no exudate, trismus, drooling, uvular deviation or sublingual lesions; speech clear  Neck: FROM; no LAD, no stridor  Psychiatric: Appropriate affect, normal mood      EKG/LABS  none    RADIOLOGY/PROCEDURES   none      COURSE & MEDICAL DECISION MAKING    ASSESSMENT, COURSE AND PLAN  Care Narrative: This is a 35-year-old male who has been on Augmentin for a total of 8 doses starting Tuesday evening with the last 1 this morning for dental pain.  He continues to have pain.  There is no obvious abscess on exam.  He is not having any airway issues.  He is afebrile and nontoxic-appearing.  I do not feel that imaging or labs are indicated.  I do not feel that oral surgery consultation is indicated.  He does have an appointment with a dentist on 6/13.  I will change him to clindamycin to see if he gets more effect.  I have also prescribed Norco to be taken at night as needed.  We discussed therapeutic/appropriate doses of Tylenol and not taking other NSAIDs along with the meloxicam.  He will return for worsening symptoms.    Narcotics Script: In prescribing controlled substances to this patient, I certify that I have obtained and reviewed the medical history of Beto Puri Jr.. I have also made a good miguel effort to obtain applicable records from other providers who have treated the patient and records did " not demonstrate any increased risk of substance abuse that would prevent me from prescribing controlled substances.     I have conducted a physical exam and documented it. I have reviewed Mr. Puri’s prescription history as maintained by the [place] Prescription Monitoring Program.     I have assessed the patient’s risk for abuse, dependency, and addiction using the validated Opioid Risk Tool available at https://www.mdcalc.com/bnhvkh-zzux-phdf-ort-narcotic-abuse.     Given the above, I believe the benefits of controlled substance therapy outweigh the risks. The reasons for prescribing controlled substances include non-narcotic, oral analgesic alternatives have been inadequate for pain control. Accordingly, I have discussed the risk and benefits, treatment plan, and alternative therapies with the patient.           DISPOSITION AND DISCUSSIONS  I have discussed management of the patient with the following physicians and FRANKLIN's:  none    Discussion of management with other QHP or appropriate source(s): None     Escalation of care considered, and ultimately not performed:Laboratory analysis and diagnostic imaging    Decision tools and prescription drugs considered including, but not limited to: Antibiotics changed Augmentin to clindamycin and Pain Medications Norco.    FINAL DIAGNOSIS  1. Pain, dental         Electronically signed by: Franchesca Gomez M.D., 6/7/2025 1:24 PM           [1] No Known Allergies

## 2025-06-07 NOTE — DISCHARGE INSTRUCTIONS
Return to the ER for fever, worsening pain, facial swelling, or difficulty breathing or swallowing    Follow-up with your dentist as scheduled    Take Tylenol 650 mg every 4 hours as needed for pain    Take ibuprofen (also known as Advil or Motrin) 600 mg every 6 hours with food and water as needed for pain and/or fever    Discontinue Augmentin and change to clindamycin to see if this is more effective for your possible dental infection    Take Norco at bedtime for severe pain.  Do not take Tylenol in addition to this as they both contain acetaminophen.      You are being prescribed a narcotic. Narcotics are addictive. Please take the narcotic sparingly and only as needed for pain. Do not drink alcohol, operate heavy machinery, or drive while taking a narcotic as it may impair your judgment and motor skills. If the narcotic contains acetaminophen, you should not take other acetaminophen-containing products, such as Tylenol, while taking this medication as it may affect your liver.  Also, taking a stool softener while taking narcotics is advised as they cause constipation.

## 2025-06-09 RX ORDER — ERGOCALCIFEROL 1.25 MG/1
50000 CAPSULE, LIQUID FILLED ORAL
Qty: 12 CAPSULE | Refills: 0 | Status: SHIPPED | OUTPATIENT
Start: 2025-06-09 | End: 2025-06-30 | Stop reason: SDUPTHER

## 2025-06-19 ENCOUNTER — OFFICE VISIT (OUTPATIENT)
Dept: SLEEP MEDICINE | Facility: MEDICAL CENTER | Age: 35
End: 2025-06-19
Attending: NURSE PRACTITIONER
Payer: COMMERCIAL

## 2025-06-19 VITALS
HEART RATE: 83 BPM | SYSTOLIC BLOOD PRESSURE: 126 MMHG | OXYGEN SATURATION: 97 % | WEIGHT: 220 LBS | HEIGHT: 67 IN | DIASTOLIC BLOOD PRESSURE: 82 MMHG | BODY MASS INDEX: 34.53 KG/M2 | RESPIRATION RATE: 12 BRPM

## 2025-06-19 DIAGNOSIS — G47.33 OSA (OBSTRUCTIVE SLEEP APNEA): Primary | ICD-10-CM

## 2025-06-19 PROCEDURE — 3079F DIAST BP 80-89 MM HG: CPT | Performed by: NURSE PRACTITIONER

## 2025-06-19 PROCEDURE — 99214 OFFICE O/P EST MOD 30 MIN: CPT | Performed by: NURSE PRACTITIONER

## 2025-06-19 PROCEDURE — 3074F SYST BP LT 130 MM HG: CPT | Performed by: NURSE PRACTITIONER

## 2025-06-19 PROCEDURE — 99213 OFFICE O/P EST LOW 20 MIN: CPT | Performed by: NURSE PRACTITIONER

## 2025-06-19 ASSESSMENT — FIBROSIS 4 INDEX: FIB4 SCORE: 0.38

## 2025-06-19 NOTE — PROGRESS NOTES
"Chief Complaint   Patient presents with    Follow-Up     SLEEP - ESTABLISHED PT CHART PREP COMPLETED ON 06/09/2025     1st compliance check    Setup date: 04/03/2025    Last Office Visit 03/20/2025 with Kaden Ramires    DME: [facility]    PAP/O2/OAT: 8-15    Wireless Data? YES [employer]        HPI:  Beto Puri Jr. is a 35 y.o. year old male here today for follow-up on ABBY with first compliance on CPAP.  Last seen for sleep study results.  Patient was set up on 4/3/2025 through [facility].  Currently using an AirFit F30 at night.  He feels that the pressures are too high and it is waking him up in the middle of the night.  The high pressures are also causing the mask to leak..     30-day compliance shows 77% use with an average time of 4 hours and 6 minutes and a residual AHI of 6.9 with a central apnea index of 3.6 and an obstructive index of 2.1.  Median pressure of 11.1 but maximum pressure is 14.2.      ROS: As per HPI and otherwise negative if not stated.    Past Medical History[1]    Past Surgical History[2]    Family History   Problem Relation Age of Onset    Dementia Maternal Grandmother        Allergies as of 06/19/2025    (No Known Allergies)        Vitals:  /82 (BP Location: Left arm, Patient Position: Sitting, BP Cuff Size: Adult)   Pulse 83   Resp 12   Ht 1.702 m (5' 7\")   Wt 99.8 kg (220 lb)   SpO2 97%     Current medications as of today Current Medications[3]      Physical Exam:   Gen:           Alert and oriented, No apparent distress. Mood and affect appropriate, normal interaction with examiner.  Eyes:          PERRL, EOM intact, sclere white, conjunctive moist.  Ears:          Not examined.   Hearing:     Grossly intact.  Nose:          Normal, no lesions or deformities.  Dentition:    Good dentition.  Oropharynx:   Tongue normal, posterior pharynx without erythema or exudate.  Neck:        Supple, trachea midline, no masses.  Respiratory Effort: No intercostal retractions or use of accessory " muscles.   Lung Auscultation:      Clear to auscultation bilaterally; no rales, rhonchi or wheezing.  CV:            Regular rate and rhythm. No murmurs, rubs or gallops.  Abd:           Not examined.   Lymphadenopathy: Not examined.  Gait and Station: Normal.  Digits and Nails: No clubbing, cyanosis, petechiae, or nodes.   Cranial Nerves: II-XII grossly intact.  Skin:        No rashes, lesions or ulcers noted.               Ext:           No cyanosis or edema.      Assessment:  1. ABBY (obstructive sleep apnea)              Plan:  Patient complains of difficulty breathing with CPAP due to high pressures.  Will adjust auto CPAP 7 to 10 cm/H2O and patient will follow-up in 6 weeks.  EPR also enabled full-time 3.  Will reassess.  May need to undergo titration study.    Please note that this dictation was created using voice recognition software. I have made every reasonable attempt to correct obvious errors, but it is possible there are errors of grammar and possibly content that I did not discover before finalizing the note.         [1] No past medical history on file.  [2] No past surgical history on file.  [3]   Current Outpatient Medications   Medication Sig Dispense Refill    vitamin D2 (ERGOCALCIFEROL) 1.25 mg (60048 Units) Cap capsule Take 1 capsule by mouth once a week 12 Capsule 0    meloxicam (MOBIC) 15 MG tablet Take 1 Tablet by mouth every day. 30 Tablet 0    FLUoxetine (PROZAC) 10 MG Cap Take 2 Capsules by mouth every day for 60 days. 120 Capsule 0     No current facility-administered medications for this visit.

## 2025-06-30 DIAGNOSIS — E55.9 VITAMIN D DEFICIENCY: Primary | ICD-10-CM

## 2025-06-30 RX ORDER — FLUOXETINE 10 MG/1
20 CAPSULE ORAL DAILY
Qty: 120 CAPSULE | Refills: 0 | Status: SHIPPED | OUTPATIENT
Start: 2025-06-30 | End: 2025-08-29

## 2025-06-30 RX ORDER — ERGOCALCIFEROL 1.25 MG/1
50000 CAPSULE, LIQUID FILLED ORAL
Qty: 12 CAPSULE | Refills: 0 | Status: SHIPPED | OUTPATIENT
Start: 2025-06-30

## 2025-06-30 NOTE — TELEPHONE ENCOUNTER
Received request via: Patient    Was the patient seen in the last year in this department? Yes    Does the patient have an active prescription (recently filled or refills available) for medication(s) requested? No    Pharmacy Name: [facility]/pharmacy #[id number] - [place] - [address] in [place]     Does the patient have residential Plus and need 100-day supply? (This applies to ALL medications) Patient does not have SCP

## 2025-06-30 NOTE — TELEPHONE ENCOUNTER
Received request via: Patient    Was the patient seen in the last year in this department? Yes    Does the patient have an active prescription (recently filled or refills available) for medication(s) requested? No    Pharmacy Name: [facility]/pharmacy #[id number] - [place] - [address] in [place]     Does the patient have care home Plus and need 100-day supply? (This applies to ALL medications) Patient does not have SCP

## 2025-07-31 ENCOUNTER — OFFICE VISIT (OUTPATIENT)
Dept: SLEEP MEDICINE | Facility: MEDICAL CENTER | Age: 35
End: 2025-07-31
Attending: NURSE PRACTITIONER
Payer: COMMERCIAL

## 2025-07-31 VITALS
WEIGHT: 220 LBS | DIASTOLIC BLOOD PRESSURE: 70 MMHG | SYSTOLIC BLOOD PRESSURE: 114 MMHG | BODY MASS INDEX: 34.53 KG/M2 | HEART RATE: 92 BPM | HEIGHT: 67 IN | OXYGEN SATURATION: 95 % | RESPIRATION RATE: 12 BRPM

## 2025-07-31 DIAGNOSIS — G47.33 OSA (OBSTRUCTIVE SLEEP APNEA): Primary | ICD-10-CM

## 2025-07-31 PROCEDURE — 99214 OFFICE O/P EST MOD 30 MIN: CPT | Performed by: NURSE PRACTITIONER

## 2025-07-31 PROCEDURE — 99213 OFFICE O/P EST LOW 20 MIN: CPT | Performed by: NURSE PRACTITIONER

## 2025-07-31 PROCEDURE — 3078F DIAST BP <80 MM HG: CPT | Performed by: NURSE PRACTITIONER

## 2025-07-31 PROCEDURE — 3074F SYST BP LT 130 MM HG: CPT | Performed by: NURSE PRACTITIONER

## 2025-07-31 ASSESSMENT — FIBROSIS 4 INDEX: FIB4 SCORE: 0.38
